# Patient Record
(demographics unavailable — no encounter records)

---

## 2024-12-16 NOTE — HISTORY OF PRESENT ILLNESS
[Patient reported mammogram was normal] : Patient reported mammogram was normal [Patient reported breast sonogram was normal] : Patient reported breast sonogram was normal [Patient reported PAP Smear was normal] : Patient reported PAP Smear was normal [Patient reported colonoscopy was abnormal] : Patient reported colonoscopy was abnormal [Y] : Positive pregnancy history [Currently Active] : currently active [Men] : men [Vaginal] : vaginal [No] : No [TextBox_4] : 55-year-old  4 para 3 LMP: 2024, presents for an annual examination. The patient reports pelvic pressure and discomfort. She is s/p TVH 3/4//2024 secondary to uterine prolapse complicated by pelvic pain secondary to pelvic prolapse. Post-operatively she continued to experience pelvic pressure. She was referred to a urogyncologist for an evaluation. A small cystocele and rectocele were noted. She also underwent urodynamic studies. Conservative management with pelvic floor PT and biofeedback was recommended. The patient reports tremendous improvement of her symptoms since undergoing PT and biofeedback. Nonetheless, She still experiences pelvic pressure. She continues to note a bulging sensation with valsalva manuever which she must reduce with defecation. [Mammogramdate] : 11/21/2024 [BreastSonogramDate] : 11/21/2024 [PapSmeardate] : 2023 [ColonoscopyDate] : 2023 [LMPDate] : 02/2024 [PGHxTotal] : 4 [[facility]itanlTerm] : 3 [[facility]iving] : 3 [PGHxABSpont] : 1 [FreeTextEntry1] : 02/2024 [FreeTextEntry3] :  has vasectomy

## 2024-12-16 NOTE — PLAN
[FreeTextEntry1] : Wellness exam. Normal physical examination.   Status post mammography and bilateral breast ultrasound November 21, 2024. Status post colonoscopy screening done in 2023. Status post ophthalmological examination in November 2024. Status post dental examination 3 weeks ago. Status post dermatological examination earlier this year.   s/p TVH for uterine prolapse. Post-operatively, the patient continues to experience pelvic pressure. A small cystocele and rectocele are noted on physical exam. She is s/p a urogynecology consultation. Her symptoms have improved with pelvic floor PT and biofeedback. Recommend conservative management. Follow-up in 4 months. Consider surgery in the future.  Atrophic vaginitis resulting in vaginal dryness and dyspareunia. Increase estradiol cream to three times per week. Discussed proper nutrition and physical exercise. Reviewed age-appropriate vaccinations.

## 2024-12-16 NOTE — HISTORY OF PRESENT ILLNESS
[Patient reported mammogram was normal] : Patient reported mammogram was normal [Patient reported breast sonogram was normal] : Patient reported breast sonogram was normal [Patient reported PAP Smear was normal] : Patient reported PAP Smear was normal [Patient reported colonoscopy was abnormal] : Patient reported colonoscopy was abnormal [Y] : Positive pregnancy history [Currently Active] : currently active [Men] : men [Vaginal] : vaginal [No] : No [TextBox_4] : 55-year-old  4 para 3 LMP: 2024, presents for an annual examination. The patient reports pelvic pressure and discomfort. She is s/p TVH 3/4//2024 secondary to uterine prolapse complicated by pelvic pain secondary to pelvic prolapse. Post-operatively she continued to experience pelvic pressure. She was referred to a urogyncologist for an evaluation. A small cystocele and rectocele were noted. She also underwent urodynamic studies. Conservative management with pelvic floor PT and biofeedback was recommended. The patient reports tremendous improvement of her symptoms since undergoing PT and biofeedback. Nonetheless, She still experiences pelvic pressure. She continues to note a bulging sensation with valsalva manuever which she must reduce with defecation. [Mammogramdate] : 11/21/2024 [BreastSonogramDate] : 11/21/2024 [PapSmeardate] : 2023 [ColonoscopyDate] : 2023 [LMPDate] : 02/2024 [PGHxTotal] : 4 [[facility] HospitallTerm] : 3 [[facility]iving] : 3 [PGHxABSpont] : 1 [FreeTextEntry1] : 02/2024 [FreeTextEntry3] :  has vasectomy

## 2024-12-16 NOTE — PHYSICAL EXAM
[Chaperone Present] : A chaperone was present in the examining room during all aspects of the physical examination [Oriented x3] : oriented x3 [Examination Of The Breasts] : a normal appearance [No Discharge] : no discharge [No Masses] : no breast masses were palpable [Labia Majora] : normal [Labia Minora] : normal [No Bleeding] : There was no active vaginal bleeding [Normal] : normal [Absent] : absent [Uterine Adnexae] : normal [FreeTextEntry2] : Appropriately responsive, alert, and no acute distress. [FreeTextEntry3] : Thyroid is non-enlarged, nontender. No palpable nodules or goiter. No lymphadenopathy. [FreeTextEntry7] : Soft. Nondistended. Nontender. No rebound or guarding. There are no palpable masses. [FreeTextEntry1] : External genitalia are within normal limits with no lesions visualized. [FreeTextEntry4] : No vaginal discharge, blood, or any lesions noted within the vaginal vault. A small cystocele and rectocele were noted with Valsalva maneuver [FreeTextEntry5] : Status post vaginal hysterectomy. A speculum was inserted without any difficulty. The cervix was absent. [FreeTextEntry6] : Status post vaginal hysterectomy. Bimanual examination was notable for an absent uterus. There were no palpable adnexal masses or adnexal tenderness.  [FreeTextEntry9] : No lesions. No palpable masses.

## 2024-12-16 NOTE — REVIEW OF SYSTEMS
[FreeTextEntry4] : Dry eyes [FreeTextEntry8] : Pelvic pressure, pelvic organ prolapse, vaginal dryness and dyspareunia

## 2025-01-27 NOTE — HISTORY OF PRESENT ILLNESS
[Patient reported mammogram was normal] : Patient reported mammogram was normal [Patient reported breast sonogram was normal] : Patient reported breast sonogram was normal [Patient reported colonoscopy was normal] : Patient reported colonoscopy was normal [Y] : Positive pregnancy history [Experiencing Menopausal Sxs] : experiencing menopausal symptoms [TextBox_4] :  55-year old  4 para 3 postmenopausal presents today to discuss hormone replacement therapy. Patient states that she has been experiencing worsening right hip pain, and new onset bilateral knee pain, weakness and paresthesia. She describes the knee pain as a shotting pain. She also notes occasional loss of balance with ambulation. She is status post radiological studies of the spine, hip and knees. She is followed by an orthopedist, and chronic pain specialist. The patient has undergone steroid injections, and a nerve block at L5-S1. She has also received physical therapy for her hip and back pain. Lastly, her primary doctor ordered blood work to rule out arthritis and autoimmune disease.  The patient believes that her symptoms may be the result of menopause, and she presents to inquire about the benefits of HRT to ameliorate her symptoms. [Mammogramdate] : 11/21/2024 [ColonoscopyDate] : 2023 [BreastSonogramDate] : 11/21/2024 [LMPDate] : 02/2024 [PGHxTotal] : 4 [[facility] HospitallTerm] : 3 [[facility] Centeriving] : 3 [PGHxABSpont] : 1 [FreeTextEntry1] : 02/2024

## 2025-01-27 NOTE — HISTORY OF PRESENT ILLNESS
[Patient reported mammogram was normal] : Patient reported mammogram was normal [Patient reported breast sonogram was normal] : Patient reported breast sonogram was normal [Patient reported colonoscopy was normal] : Patient reported colonoscopy was normal [Y] : Positive pregnancy history [Experiencing Menopausal Sxs] : experiencing menopausal symptoms [TextBox_4] :  55-year old  4 para 3 postmenopausal presents today to discuss hormone replacement therapy. Patient states that she has been experiencing worsening right hip pain, and new onset bilateral knee pain, weakness and paresthesia. She describes the knee pain as a shotting pain. She also notes occasional loss of balance with ambulation. She is status post radiological studies of the spine, hip and knees. She is followed by an orthopedist, and chronic pain specialist. The patient has undergone steroid injections, and a nerve block at L5-S1. She has also received physical therapy for her hip and back pain. Lastly, her primary doctor ordered blood work to rule out arthritis and autoimmune disease.  The patient believes that her symptoms may be the result of menopause, and she presents to inquire about the benefits of HRT to ameliorate her symptoms. [Mammogramdate] : 11/21/2024 [BreastSonogramDate] : 11/21/2024 [ColonoscopyDate] : 2023 [LMPDate] : 02/2024 [PGHxTotal] : 4 [[facility] CenterlTerm] : 3 [[facility]iving] : 3 [PGHxABSpont] : 1 [FreeTextEntry1] : 02/2024

## 2025-01-27 NOTE — PLAN
[FreeTextEntry1] : Discussion: Common symptoms and complications of menopause were reviewed: vasomotor symptoms, vaginal dryness, dyspareunia, decreased libido, sleep disturbances, mood swings, depression/anxiety, bladder dysfunction, skin changes, irregular heart beat and palpitations, osteoporosis, joint, bone and muscle aches.  Moreover, the patient was advised that although joint, bone and muscle aches can be associated with loss o estrogen, a complete evaluation of her symptoms are paramount. In addition, HRT is not traditionally recommended for first line therapy for joint, bone or muscle ailments.  Nonetheless, a short trial of estrogen replacement therapy is not unreasonable to consider. Options for the administration of estrogen were reviewed, ie, oral, transdermal and topical. The advantages and disadvantages of each option were reviewed.  Common side effects of ERT were reviewed ie, breast tenderness, headache, vaginal bleeding, nausea and abdominal bloating. The patient is status post TVH.  After a long discussion of the above, the patient agreed to begin a short trial of transdermal ERT. Lastly, if her symptoms improve, she was advised that long term HRT is not recommended after age 60, secondary to increased risk of cardiovascular disease, stroke, thromboembolic disease and dementia. Although many of these risks have been associated with combined estrogen with progesterone therapy, and not with estrogen therapy alone.  Total time of the consultation, which was completely devoted to symptoms/complications of menopause, and the benefits of HRT, was 30 minutes. All of the patient's questions were answered to her satisfaction.

## 2025-03-17 NOTE — REVIEW OF SYSTEMS
[FreeTextEntry8] : urinary incontinence with orgasm [FreeTextEntry9] : back, hip and knee pain. [de-identified] : leg paresthesia [de-identified] : improved sleep disturbances [de-identified] : improved vasomotor symptoms

## 2025-03-17 NOTE — HISTORY OF PRESENT ILLNESS
[Y] : Positive pregnancy history [Hot Flashes] : hot flashes [Experiencing Menopausal Sxs] : experiencing menopausal symptoms [Menopause Age: ____] : age at menopause was [unfilled] [Currently Active] : currently active [TextBox_4] : 55 -year old  4 para 3 s/p TVH presents for follow on ERT. She presented in January complaining of worsening musculoskeletal pain, including back, hip, and knee pain. She has a longstanding history of herniated disc and back pain. Her pain has been managed with steroid injections and epidural blocks. Nonetheless, the patient requested starting on ERT, since menopause or loss of estrogen has been associated with joint, bone and muscle ailments. The patient started using the Estradiol patch at the end of January.  Since starting on the estradiol patch, she has noted significant improvement of her vasomotor symptoms and sleep disturbances. Nonetheless, she continues to experience severe back pain, knee pain and paresthesia in her legs. She is scheduled for a MRI tomorrow. She is status post blood work She will also follow-up with pain management and the neurosurgeon at the end of this month. [Mammogramdate] : 11/21/2024 [BreastSonogramDate] : 11/21/2024 [ColonoscopyDate] : 2023 [LMPDate] : 02/2024 [PGHxTotal] : 4 [[facility]Term] : 3 [[facility]iving] : 3 [PGHxABSpont] : 1

## 2025-03-17 NOTE — PLAN
[FreeTextEntry1] : Discussion:  The risks and benefits of ERT were reviewed in depth. The patient denies side effects from the estradiol therapy. Moreover, she notes significant improvement of her vasomotor symptoms and sleep disturbances. The plan is to continue the estradiol therapy.  Urinary incontinence with orgasm. The patient is status post TVH and anterior colporrhaphy. She is presently followed by a urogynecologist. Continue with conservative management. She may consider medical or surgical intervention in the future.  Total time of the consultation, which was completely devoted to relief of menopausal symptoms with ERT was 25 minutes. All of the patient's questions were answered to her satisfaction.

## 2025-03-17 NOTE — REVIEW OF SYSTEMS
[FreeTextEntry8] : urinary incontinence with orgasm [FreeTextEntry9] : back, hip and knee pain. [de-identified] : leg paresthesia [de-identified] : improved sleep disturbances [de-identified] : improved vasomotor symptoms

## 2025-03-17 NOTE — HISTORY OF PRESENT ILLNESS
[Y] : Positive pregnancy history [Hot Flashes] : hot flashes [Experiencing Menopausal Sxs] : experiencing menopausal symptoms [Menopause Age: ____] : age at menopause was [unfilled] [Currently Active] : currently active [TextBox_4] : 55 -year old  4 para 3 s/p TVH presents for follow on ERT. She presented in January complaining of worsening musculoskeletal pain, including back, hip, and knee pain. She has a longstanding history of herniated disc and back pain. Her pain has been managed with steroid injections and epidural blocks. Nonetheless, the patient requested starting on ERT, since menopause or loss of estrogen has been associated with joint, bone and muscle ailments. The patient started using the Estradiol patch at the end of January.  Since starting on the estradiol patch, she has noted significant improvement of her vasomotor symptoms and sleep disturbances. Nonetheless, she continues to experience severe back pain, knee pain and paresthesia in her legs. She is scheduled for a MRI tomorrow. She is status post blood work She will also follow-up with pain management and the neurosurgeon at the end of this month. [Mammogramdate] : 11/21/2024 [BreastSonogramDate] : 11/21/2024 [ColonoscopyDate] : 2023 [LMPDate] : 02/2024 [PGHxTotal] : 4 [[facility]allTerm] : 3 [[facility]iving] : 3 [PGHxABSpont] : 1

## 2025-04-10 NOTE — HISTORY OF PRESENT ILLNESS
[de-identified] : Patient is a 55-year-old female presenting for evaluation of lateral knee pain to the left knee.  She states that 2 days ago the knee buckled and gave out on her resulting in sharp pain laterally.  She will have going to the outside ER where x-rays and MRI are performed.  These revealed no signs of fracture or meniscal injury.  This did reveal however that she has a Baker's cyst.  Patient's pain is localized anterolaterally and posterolaterally.  Pain is worse with deep flexion as well as with all weightbearing activity including walking distance and rising from a seated position.  She is currently using crutches for ambulation.  Patient describes the pain as shooting, burning, and numbness and tingling.  She is apparently seen a neurologist and rheumatologist and "no one has the answers"

## 2025-04-10 NOTE — PHYSICAL EXAM
[de-identified] :  The patient appears well nourished and in no apparent distress. The patient is alert and oriented to person, place, and time. Affect and mood appear normal. The head is normocephalic and atraumatic. The eyes reveal normal sclera and extra ocular muscles are intact. The tongue is midline with no apparent lesions. Skin shows normal turgor with no evidence of eczema or psoriasis. No respiratory distress noted. Sensation grossly intact. [de-identified] :  Exam of the left knee shows 0 to 145 degrees of flexion measured with a goniometer. Gerdy tubercle is nontender. Fibula head is nontender. Lateral joint line is nontender. There is no effusion. There is tenderness lateral to the patella. 5/5 motor strength bilaterally distally. Sensation intact distally. [de-identified] : Outside X-ray: 3 views of the left knee demonstrate No radiographic osseous pathology. X-ray today in office: merchant view of the left knee demonstrate mild patellofemoral compartment arthritis  MRI done by [facility] on 4/9/25  Impression and results from my independent review in office today and radiology report: No meniscal tear. No cruciate or collateral ligament tear. No fracture. Baker's cyst.

## 2025-04-10 NOTE — DISCUSSION/SUMMARY
[de-identified] :  DIANNE WINTER is a 55 year old female who presents with left knee mild patellofemoral compartment arthritis.  Based on her exam as well as imaging findings I cannot explain the intermittent sharp stabbing pain she is experiencing in the lateral knee.  It seems out of proportion.  I recommended alternative therapies such as acupuncture.   Alternatively she could consider a nerve ablation around the knee.  This would be done through pain management.